# Patient Record
Sex: MALE | Race: WHITE | HISPANIC OR LATINO | URBAN - METROPOLITAN AREA
[De-identification: names, ages, dates, MRNs, and addresses within clinical notes are randomized per-mention and may not be internally consistent; named-entity substitution may affect disease eponyms.]

---

## 2019-11-07 PROBLEM — Z00.00 ENCOUNTER FOR PREVENTIVE HEALTH EXAMINATION: Status: ACTIVE | Noted: 2019-11-07

## 2023-10-30 ENCOUNTER — EMERGENCY (EMERGENCY)
Facility: HOSPITAL | Age: 23
LOS: 1 days | Discharge: HOME | End: 2023-10-30
Attending: STUDENT IN AN ORGANIZED HEALTH CARE EDUCATION/TRAINING PROGRAM
Payer: SELF-PAY

## 2023-10-30 VITALS
SYSTOLIC BLOOD PRESSURE: 126 MMHG | OXYGEN SATURATION: 97 % | DIASTOLIC BLOOD PRESSURE: 80 MMHG | TEMPERATURE: 98 F | RESPIRATION RATE: 18 BRPM | HEART RATE: 90 BPM | WEIGHT: 240.08 LBS

## 2023-10-30 PROCEDURE — 73080 X-RAY EXAM OF ELBOW: CPT

## 2023-10-30 PROCEDURE — 99284 EMERGENCY DEPT VISIT MOD MDM: CPT

## 2023-10-30 PROCEDURE — 73080 X-RAY EXAM OF ELBOW: CPT | Mod: 26,LT

## 2023-10-30 PROCEDURE — 73502 X-RAY EXAM HIP UNI 2-3 VIEWS: CPT | Mod: 26,LT

## 2023-10-30 PROCEDURE — 73502 X-RAY EXAM HIP UNI 2-3 VIEWS: CPT

## 2023-10-30 RX ORDER — ACETAMINOPHEN 500 MG
975 TABLET ORAL ONCE
Refills: 0 | Status: COMPLETED | OUTPATIENT
Start: 2023-10-30 | End: 2023-10-30

## 2023-10-30 RX ORDER — ONDANSETRON 8 MG/1
4 TABLET, FILM COATED ORAL ONCE
Refills: 0 | Status: COMPLETED | OUTPATIENT
Start: 2023-10-30 | End: 2023-10-30

## 2023-10-30 RX ORDER — METHOCARBAMOL 500 MG/1
1 TABLET, FILM COATED ORAL
Qty: 9 | Refills: 0
Start: 2023-10-30 | End: 2023-11-01

## 2023-10-30 RX ORDER — DIAZEPAM 5 MG
5 TABLET ORAL ONCE
Refills: 0 | Status: DISCONTINUED | OUTPATIENT
Start: 2023-10-30 | End: 2023-10-30

## 2023-10-30 RX ADMIN — ONDANSETRON 4 MILLIGRAM(S): 8 TABLET, FILM COATED ORAL at 19:03

## 2023-10-30 RX ADMIN — Medication 5 MILLIGRAM(S): at 19:04

## 2023-10-30 RX ADMIN — Medication 975 MILLIGRAM(S): at 19:04

## 2023-10-30 NOTE — ED PROVIDER NOTE - PATIENT PORTAL LINK FT
You can access the FollowMyHealth Patient Portal offered by [facility] by registering at the following website: [email or web address]. By joining [email or web address]’s FollowMyHealth portal, you will also be able to view your health information using other applications (apps) compatible with our system.
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
## 2023-10-30 NOTE — ED PROVIDER NOTE - NSICDXPASTMEDICALHX_GEN_ALL_CORE_FT
PAST MEDICAL HISTORY:  Anxiety     Cervical herniated disc     Hypertension     Lumbar herniated disc
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
## 2023-10-30 NOTE — ED PROVIDER NOTE - PHYSICAL EXAMINATION
General: NAD, AAOx4  HEENT: NCAT, moist mucous membranes  Eyes: PERRL. no scleral icterus. EOMI. pink conjunctiva  Neck:. Soft and supple. Full ROM without pain. No cervical midline tenderness.   Lung: CTAB, no respiratory distress, speaking in full sentences. No wheezes, rales or rhonchi.  CV: Regular rate and rhythm. +S1/S2. Peripheral pulses 2+  Abd: +BS x 4 quadrants, soft, nontender, nondistended. No guarding or rebound.  MSK: Abrasion to posterior proximal left upper extremity. Tenderness to palpation of left arm and left hip. No spine or joint tenderness to palpation. No edema, no visible deformities. No motor or sensory deficits  Neuro: No focal neurologic deficits. A&O x 4, moving all 4 extremities. 5/5 strength in extremities x 4. Sensation intact.   Skin: No rashes, lacerations, contusions noted. General: NAD, AAOx4. anxious   HEENT: NCAT, moist mucous membranes  Eyes: PERRL. no scleral icterus. EOMI. pink conjunctiva  Neck:. Soft and supple. Full ROM without pain. No cervical midline tenderness.   Lung: CTAB, no respiratory distress, speaking in full sentences. No wheezes, rales or rhonchi.  CV: Regular rate and rhythm. +S1/S2. Peripheral pulses 2+, no chest wall TTP   Abd: +BS x 4 quadrants, soft, nontender, nondistended. No guarding or rebound.  MSK: Abrasion to posterior proximal left upper extremity. Tenderness to palpation of left arm/ proximal left elbow  and left hip on ant lateral aspect . No spine or joint tenderness to palpation. No edema, no visible deformities. No motor or sensory deficits  Neuro: No focal neurologic deficits. A&O x 4, moving all 4 extremities. 5/5 strength in extremities x 4. Sensation intact.   Skin: No rashes, lacerations, contusions noted.

## 2023-10-30 NOTE — ED PROVIDER NOTE - NS ED ROS FT
Const: Denies fever, chills  HEENT: Denies blurry vision, tinnitus, ear pain  Neck: Denies neck pain/stiffness  Resp: Denies SOB, dyspnea  Cardiovascular: Denies CP, palpitations  GI: Denies nausea, vomiting, abdominal pain, incontinence  : Denies incontinence  MSK: Pain to left proximal arm, pain to left hip. Denies back pain  Neuro: HA. Denies dizziness, numbness, weakness Const: Denies fever, chills  HEENT: Denies blurry vision, tinnitus, ear pain  Neck: Denies neck pain/stiffness  Resp: Denies SOB, dyspnea  Cardiovascular: Denies CP, palpitations  GI: Denies nausea, vomiting, abdominal pain, incontinence  : Denies incontinence  MSK: Pain to left proximal arm, pain to left hip. Denies back pain  Neuro: +HA. Denies dizziness, numbness, weakness